# Patient Record
Sex: FEMALE | Race: WHITE | NOT HISPANIC OR LATINO | URBAN - METROPOLITAN AREA
[De-identification: names, ages, dates, MRNs, and addresses within clinical notes are randomized per-mention and may not be internally consistent; named-entity substitution may affect disease eponyms.]

---

## 2017-06-09 ENCOUNTER — OUTPATIENT (OUTPATIENT)
Dept: OUTPATIENT SERVICES | Facility: HOSPITAL | Age: 62
LOS: 1 days | End: 2017-06-09
Payer: COMMERCIAL

## 2017-06-09 ENCOUNTER — APPOINTMENT (OUTPATIENT)
Dept: RADIOLOGY | Facility: CLINIC | Age: 62
End: 2017-06-09

## 2017-06-09 DIAGNOSIS — Z00.8 ENCOUNTER FOR OTHER GENERAL EXAMINATION: ICD-10-CM

## 2017-06-09 DIAGNOSIS — M81.0 AGE-RELATED OSTEOPOROSIS WITHOUT CURRENT PATHOLOGICAL FRACTURE: ICD-10-CM

## 2017-06-09 PROCEDURE — 77080 DXA BONE DENSITY AXIAL: CPT

## 2018-11-27 ENCOUNTER — RX RENEWAL (OUTPATIENT)
Age: 63
End: 2018-11-27

## 2018-12-17 ENCOUNTER — APPOINTMENT (OUTPATIENT)
Dept: ENDOCRINOLOGY | Facility: CLINIC | Age: 63
End: 2018-12-17
Payer: COMMERCIAL

## 2018-12-17 VITALS
BODY MASS INDEX: 21.9 KG/M2 | HEIGHT: 61 IN | OXYGEN SATURATION: 96 % | DIASTOLIC BLOOD PRESSURE: 74 MMHG | WEIGHT: 116 LBS | SYSTOLIC BLOOD PRESSURE: 126 MMHG | HEART RATE: 74 BPM

## 2018-12-17 PROCEDURE — 99203 OFFICE O/P NEW LOW 30 MIN: CPT

## 2018-12-18 ENCOUNTER — TRANSCRIPTION ENCOUNTER (OUTPATIENT)
Age: 63
End: 2018-12-18

## 2019-02-18 ENCOUNTER — APPOINTMENT (OUTPATIENT)
Dept: ENDOCRINOLOGY | Facility: CLINIC | Age: 64
End: 2019-02-18
Payer: COMMERCIAL

## 2019-02-18 VITALS
HEART RATE: 77 BPM | DIASTOLIC BLOOD PRESSURE: 68 MMHG | WEIGHT: 117.38 LBS | OXYGEN SATURATION: 99 % | HEIGHT: 61 IN | SYSTOLIC BLOOD PRESSURE: 104 MMHG | BODY MASS INDEX: 22.16 KG/M2

## 2019-02-18 PROCEDURE — 99213 OFFICE O/P EST LOW 20 MIN: CPT

## 2019-02-18 NOTE — ASSESSMENT
[FreeTextEntry1] : Patient with a long term history of osteoporosis. Blood work for secondary causes were negative. She is due for a BD in June. . For now she will continue a healthy lifestyle exercise and maintain an  adequate  diet in calcium and vitamin D.She was told in the past she had thyroid nodules and her sister had a h/o of thyroid cancer; she will do thyroid sonogram prior to her next visit.  [Importance of Diet and Exercise] : importance of diet and exercise to improve glycemic control, achieve weight loss and improve cardiovascular health

## 2019-02-18 NOTE — HISTORY OF PRESENT ILLNESS
[FreeTextEntry1] : Patient with a history of osteoporosis and on Boniva for 5 years. Recently this was stopped. She was told that her bone density was stable. She does have a history of a wrist fracture a few years ago. She does take vitamin D and calcium daily. She has no history of kidney stones. She has never been on steroids.Overall her diet is good and she does exercise regularly. As far as she knows her mother never had  a hip fracture. Her sister did have thyroid cancer.

## 2019-02-28 ENCOUNTER — TRANSCRIPTION ENCOUNTER (OUTPATIENT)
Age: 64
End: 2019-02-28

## 2019-03-27 ENCOUNTER — RX RENEWAL (OUTPATIENT)
Age: 64
End: 2019-03-27

## 2019-03-27 ENCOUNTER — MEDICATION RENEWAL (OUTPATIENT)
Age: 64
End: 2019-03-27

## 2019-03-27 DIAGNOSIS — G47.00 INSOMNIA, UNSPECIFIED: ICD-10-CM

## 2019-03-28 ENCOUNTER — RX RENEWAL (OUTPATIENT)
Age: 64
End: 2019-03-28

## 2019-05-20 ENCOUNTER — RX RENEWAL (OUTPATIENT)
Age: 64
End: 2019-05-20

## 2019-05-21 ENCOUNTER — APPOINTMENT (OUTPATIENT)
Dept: INTERNAL MEDICINE | Facility: CLINIC | Age: 64
End: 2019-05-21
Payer: COMMERCIAL

## 2019-05-21 PROCEDURE — 36415 COLL VENOUS BLD VENIPUNCTURE: CPT

## 2019-06-04 ENCOUNTER — APPOINTMENT (OUTPATIENT)
Dept: INTERNAL MEDICINE | Facility: CLINIC | Age: 64
End: 2019-06-04
Payer: COMMERCIAL

## 2019-06-04 ENCOUNTER — NON-APPOINTMENT (OUTPATIENT)
Age: 64
End: 2019-06-04

## 2019-06-04 ENCOUNTER — RECORD ABSTRACTING (OUTPATIENT)
Age: 64
End: 2019-06-04

## 2019-06-04 VITALS
BODY MASS INDEX: 21.62 KG/M2 | HEIGHT: 61.25 IN | WEIGHT: 116 LBS | SYSTOLIC BLOOD PRESSURE: 124 MMHG | DIASTOLIC BLOOD PRESSURE: 80 MMHG

## 2019-06-04 DIAGNOSIS — Z82.0 FAMILY HISTORY OF EPILEPSY AND OTHER DISEASES OF THE NERVOUS SYSTEM: ICD-10-CM

## 2019-06-04 DIAGNOSIS — Z87.448 PERSONAL HISTORY OF OTHER DISEASES OF URINARY SYSTEM: ICD-10-CM

## 2019-06-04 DIAGNOSIS — Z92.89 PERSONAL HISTORY OF OTHER MEDICAL TREATMENT: ICD-10-CM

## 2019-06-04 DIAGNOSIS — Z86.69 PERSONAL HISTORY OF OTHER DISEASES OF THE NERVOUS SYSTEM AND SENSE ORGANS: ICD-10-CM

## 2019-06-04 DIAGNOSIS — M79.606 PAIN IN LEG, UNSPECIFIED: ICD-10-CM

## 2019-06-04 DIAGNOSIS — S54.00XA INJURY OF ULNAR NERVE AT FOREARM LEVEL, UNSPECIFIED ARM, INITIAL ENCOUNTER: ICD-10-CM

## 2019-06-04 DIAGNOSIS — S62.101A FRACTURE OF UNSPECIFIED CARPAL BONE, RIGHT WRIST, INITIAL ENCOUNTER FOR CLOSED FRACTURE: ICD-10-CM

## 2019-06-04 DIAGNOSIS — H02.403 UNSPECIFIED PTOSIS OF BILATERAL EYELIDS: ICD-10-CM

## 2019-06-04 DIAGNOSIS — H33.329 ROUND HOLE, UNSPECIFIED EYE: ICD-10-CM

## 2019-06-04 DIAGNOSIS — Z01.818 ENCOUNTER FOR OTHER PREPROCEDURAL EXAMINATION: ICD-10-CM

## 2019-06-04 DIAGNOSIS — R31.29 OTHER MICROSCOPIC HEMATURIA: ICD-10-CM

## 2019-06-04 DIAGNOSIS — Z87.440 PERSONAL HISTORY OF URINARY (TRACT) INFECTIONS: ICD-10-CM

## 2019-06-04 DIAGNOSIS — F41.9 ANXIETY DISORDER, UNSPECIFIED: ICD-10-CM

## 2019-06-04 DIAGNOSIS — Z87.39 PERSONAL HISTORY OF OTHER DISEASES OF THE MUSCULOSKELETAL SYSTEM AND CONNECTIVE TISSUE: ICD-10-CM

## 2019-06-04 LAB
25(OH)D3 SERPL-MCNC: 43.2 NG/ML
ALBUMIN SERPL ELPH-MCNC: 4.9 G/DL
ALP BLD-CCNC: 66 U/L
ALT SERPL-CCNC: 24 U/L
ANION GAP SERPL CALC-SCNC: 10 MMOL/L
AST SERPL-CCNC: 19 U/L
BASOPHILS # BLD AUTO: 0.04 K/UL
BASOPHILS NFR BLD AUTO: 1.2 %
BILIRUB SERPL-MCNC: 0.8 MG/DL
BILIRUB UR QL STRIP: NEGATIVE
BUN SERPL-MCNC: 20 MG/DL
CALCIUM SERPL-MCNC: 9.9 MG/DL
CHLORIDE SERPL-SCNC: 105 MMOL/L
CHOLEST SERPL-MCNC: 193 MG/DL
CHOLEST/HDLC SERPL: 3.3 RATIO
CK SERPL-CCNC: 52 U/L
CLARITY UR: CLEAR
CO2 SERPL-SCNC: 28 MMOL/L
CREAT SERPL-MCNC: 0.78 MG/DL
EOSINOPHIL # BLD AUTO: 0.05 K/UL
EOSINOPHIL NFR BLD AUTO: 1.5 %
ESTIMATED AVERAGE GLUCOSE: 108 MG/DL
FERRITIN SERPL-MCNC: 67 NG/ML
GLUCOSE SERPL-MCNC: 110 MG/DL
GLUCOSE UR-MCNC: NEGATIVE
HBA1C MFR BLD HPLC: 5.4 %
HCG UR QL: NORMAL EU/DL
HCT VFR BLD CALC: 41.2 %
HDLC SERPL-MCNC: 58 MG/DL
HGB BLD-MCNC: 12.5 G/DL
HGB UR QL STRIP.AUTO: NORMAL
IMM GRANULOCYTES NFR BLD AUTO: 0.3 %
IRON SATN MFR SERPL: 44 %
IRON SERPL-MCNC: 184 UG/DL
KETONES UR-MCNC: NEGATIVE
LDLC SERPL CALC-MCNC: 114 MG/DL
LEUKOCYTE ESTERASE UR QL STRIP: NORMAL
LYMPHOCYTES # BLD AUTO: 1.21 K/UL
LYMPHOCYTES NFR BLD AUTO: 36 %
MAN DIFF?: NORMAL
MCHC RBC-ENTMCNC: 22.4 PG
MCHC RBC-ENTMCNC: 30.3 GM/DL
MCV RBC AUTO: 74 FL
MONOCYTES # BLD AUTO: 0.28 K/UL
MONOCYTES NFR BLD AUTO: 8.3 %
NEUTROPHILS # BLD AUTO: 1.77 K/UL
NEUTROPHILS NFR BLD AUTO: 52.7 %
NITRITE UR QL STRIP: NEGATIVE
PH UR STRIP: 6
PLATELET # BLD AUTO: 319 K/UL
POTASSIUM SERPL-SCNC: 4.3 MMOL/L
PROT SERPL-MCNC: 7.3 G/DL
PROT UR STRIP-MCNC: NEGATIVE
RBC # BLD: 5.57 M/UL
RBC # FLD: 15 %
SODIUM SERPL-SCNC: 143 MMOL/L
SP GR UR STRIP: 1.02
T4 FREE SERPL-MCNC: 1.2 NG/DL
TIBC SERPL-MCNC: 416 UG/DL
TRIGL SERPL-MCNC: 107 MG/DL
TSH SERPL-ACNC: 3.08 UIU/ML
UIBC SERPL-MCNC: 232 UG/DL
VIT B12 SERPL-MCNC: 1002 PG/ML
WBC # FLD AUTO: 3.36 K/UL

## 2019-06-04 PROCEDURE — 81003 URINALYSIS AUTO W/O SCOPE: CPT | Mod: QW

## 2019-06-04 PROCEDURE — 36415 COLL VENOUS BLD VENIPUNCTURE: CPT

## 2019-06-04 PROCEDURE — 99396 PREV VISIT EST AGE 40-64: CPT | Mod: 25

## 2019-06-04 PROCEDURE — 93000 ELECTROCARDIOGRAM COMPLETE: CPT

## 2019-06-04 NOTE — ASSESSMENT
[FreeTextEntry1] : doing well at ideal weight\par exercising without issues\par osteoporosis sees Hyacinth Barragan\par for bone densitometry and thyroid sono\par has appointment for colonoscopy 8 2019 Jannet

## 2019-06-04 NOTE — HISTORY OF PRESENT ILLNESS
[FreeTextEntry1] : here for complete exam [de-identified] : Osteoporosis now seeing Dr. Barragan in consultation off meds\par Normal colonoscopy 2013\par Regular GYN Pap and mammogram\par Family history sister with thyroid cancer\par long hx microscopic hematuria saw Thall in past for this several times negative workups \par doing well on low dose escitalopram not interested in stopping it, very infrequent use of xanax and zolpidem since she is on it

## 2019-06-09 ENCOUNTER — FORM ENCOUNTER (OUTPATIENT)
Age: 64
End: 2019-06-09

## 2019-06-10 ENCOUNTER — APPOINTMENT (OUTPATIENT)
Dept: ULTRASOUND IMAGING | Facility: CLINIC | Age: 64
End: 2019-06-10
Payer: COMMERCIAL

## 2019-06-10 ENCOUNTER — APPOINTMENT (OUTPATIENT)
Dept: RADIOLOGY | Facility: CLINIC | Age: 64
End: 2019-06-10
Payer: COMMERCIAL

## 2019-06-10 ENCOUNTER — OUTPATIENT (OUTPATIENT)
Dept: OUTPATIENT SERVICES | Facility: HOSPITAL | Age: 64
LOS: 1 days | End: 2019-06-10
Payer: COMMERCIAL

## 2019-06-10 DIAGNOSIS — E04.9 NONTOXIC GOITER, UNSPECIFIED: ICD-10-CM

## 2019-06-10 DIAGNOSIS — Z00.8 ENCOUNTER FOR OTHER GENERAL EXAMINATION: ICD-10-CM

## 2019-06-10 LAB
MEV IGG FLD QL IA: >300 AU/ML
MEV IGG+IGM SER-IMP: POSITIVE

## 2019-06-10 PROCEDURE — 77080 DXA BONE DENSITY AXIAL: CPT

## 2019-06-10 PROCEDURE — 77080 DXA BONE DENSITY AXIAL: CPT | Mod: 26

## 2019-06-10 PROCEDURE — 76536 US EXAM OF HEAD AND NECK: CPT | Mod: 26

## 2019-06-10 PROCEDURE — 76536 US EXAM OF HEAD AND NECK: CPT

## 2019-07-10 ENCOUNTER — APPOINTMENT (OUTPATIENT)
Dept: ENDOCRINOLOGY | Facility: CLINIC | Age: 64
End: 2019-07-10
Payer: COMMERCIAL

## 2019-07-10 VITALS
HEART RATE: 76 BPM | DIASTOLIC BLOOD PRESSURE: 62 MMHG | BODY MASS INDEX: 21.9 KG/M2 | SYSTOLIC BLOOD PRESSURE: 118 MMHG | HEIGHT: 61.25 IN | OXYGEN SATURATION: 96 % | WEIGHT: 117.5 LBS

## 2019-07-10 PROCEDURE — 99213 OFFICE O/P EST LOW 20 MIN: CPT

## 2019-07-10 NOTE — HISTORY OF PRESENT ILLNESS
[FreeTextEntry1] : Patient with a history of osteoporosis and on Boniva for 5 years. Recently this was stopped. She was told that her bone density was stable. She does have a history of a wrist fracture a few years ago. She does take vitamin D and calcium daily. She has no history of kidney stones. She has never been on steroids.Overall her diet is good and she does exercise regularly. As far as she knows her mother never had  a hip fracture. Her sister did have thyroid cancer. \par She recently repeated the BD and did a thyroid sonogram.

## 2019-07-10 NOTE — PHYSICAL EXAM
[No Acute Distress] : no acute distress [Alert] : alert [Well Nourished] : well nourished [Well Developed] : well developed [EOMI] : extra ocular movement intact [Normal Sclera/Conjunctiva] : normal sclera/conjunctiva [No Proptosis] : no proptosis [Normal Oropharynx] : the oropharynx was normal [Thyroid Not Enlarged] : the thyroid was not enlarged [No Thyroid Nodules] : there were no palpable thyroid nodules [No Accessory Muscle Use] : no accessory muscle use [No Respiratory Distress] : no respiratory distress [Normal S1, S2] : normal S1 and S2 [Normal Rate] : heart rate was normal  [Clear to Auscultation] : lungs were clear to auscultation bilaterally [Regular Rhythm] : with a regular rhythm [Pedal Pulses Normal] : the pedal pulses are present [Normal Bowel Sounds] : normal bowel sounds [No Edema] : there was no peripheral edema [Soft] : abdomen soft [Not Tender] : non-tender [Not Distended] : not distended [Post Cervical Nodes] : posterior cervical nodes [Anterior Cervical Nodes] : anterior cervical nodes [Axillary Nodes] : axillary nodes [Normal] : normal and non tender [No Spinal Tenderness] : no spinal tenderness [Spine Straight] : spine straight [No Stigmata of Cushings Syndrome] : no stigmata of cushings syndrome [Normal Gait] : normal gait [Normal Strength/Tone] : muscle strength and tone were normal [Acanthosis Nigricans] : no acanthosis nigricans [No Rash] : no rash [Normal Reflexes] : deep tendon reflexes were 2+ and symmetric [Oriented x3] : oriented to person, place, and time [No Tremors] : no tremors

## 2019-10-11 ENCOUNTER — MOBILE ON CALL (OUTPATIENT)
Age: 64
End: 2019-10-11

## 2019-11-11 ENCOUNTER — APPOINTMENT (OUTPATIENT)
Dept: ENDOCRINOLOGY | Facility: CLINIC | Age: 64
End: 2019-11-11

## 2019-12-02 ENCOUNTER — MEDICATION RENEWAL (OUTPATIENT)
Age: 64
End: 2019-12-02

## 2019-12-03 ENCOUNTER — MEDICATION RENEWAL (OUTPATIENT)
Age: 64
End: 2019-12-03

## 2020-07-01 ENCOUNTER — NON-APPOINTMENT (OUTPATIENT)
Age: 65
End: 2020-07-01

## 2020-07-01 ENCOUNTER — APPOINTMENT (OUTPATIENT)
Dept: INTERNAL MEDICINE | Facility: CLINIC | Age: 65
End: 2020-07-01
Payer: COMMERCIAL

## 2020-07-01 VITALS — HEIGHT: 61 IN | BODY MASS INDEX: 20.77 KG/M2 | WEIGHT: 110 LBS

## 2020-07-01 VITALS — DIASTOLIC BLOOD PRESSURE: 70 MMHG | SYSTOLIC BLOOD PRESSURE: 108 MMHG

## 2020-07-01 DIAGNOSIS — R03.0 ELEVATED BLOOD-PRESSURE READING, W/OUT DIAGNOSIS OF HYPERTENSION: ICD-10-CM

## 2020-07-01 DIAGNOSIS — I10 ESSENTIAL (PRIMARY) HYPERTENSION: ICD-10-CM

## 2020-07-01 LAB
BILIRUB UR QL STRIP: NEGATIVE
GLUCOSE UR-MCNC: NEGATIVE
HCG UR QL: 0.2 EU/DL
HGB UR QL STRIP.AUTO: NORMAL
KETONES UR-MCNC: NEGATIVE
LEUKOCYTE ESTERASE UR QL STRIP: NORMAL
NITRITE UR QL STRIP: NEGATIVE
PH UR STRIP: 6.5
PROT UR STRIP-MCNC: NORMAL
SP GR UR STRIP: 1.02

## 2020-07-01 PROCEDURE — 93000 ELECTROCARDIOGRAM COMPLETE: CPT | Mod: 59

## 2020-07-01 PROCEDURE — 99396 PREV VISIT EST AGE 40-64: CPT | Mod: 25

## 2020-07-01 PROCEDURE — G0444 DEPRESSION SCREEN ANNUAL: CPT

## 2020-07-01 PROCEDURE — 36415 COLL VENOUS BLD VENIPUNCTURE: CPT

## 2020-07-01 PROCEDURE — 81003 URINALYSIS AUTO W/O SCOPE: CPT | Mod: QW

## 2020-07-01 RX ORDER — TERIPARATIDE 250 UG/ML
600 INJECTION, SOLUTION SUBCUTANEOUS DAILY
Qty: 1 | Refills: 5 | Status: COMPLETED | COMMUNITY
Start: 2019-07-10 | End: 2020-07-01

## 2020-07-01 NOTE — HISTORY OF PRESENT ILLNESS
[FreeTextEntry1] : here for complete exam [de-identified] : osteoporosis sees Dr Hyacinth Barragan in consultation on Forteo\par normal colonoscopy 2013\par Regular GYN Pap and mammogram\par Family history sister with thyroid cancer\par long hx microscopic hematuria saw Thall in past for this several times negative workups \par doing well on low dose escitalopram

## 2020-07-01 NOTE — PHYSICAL EXAM
[No Acute Distress] : no acute distress [Well Developed] : well developed [Well Nourished] : well nourished [Normal Sclera/Conjunctiva] : normal sclera/conjunctiva [Well-Appearing] : well-appearing [PERRL] : pupils equal round and reactive to light [Normal Outer Ear/Nose] : the outer ears and nose were normal in appearance [No JVD] : no jugular venous distention [No Lymphadenopathy] : no lymphadenopathy [Supple] : supple [Thyroid Normal, No Nodules] : the thyroid was normal and there were no nodules present [No Respiratory Distress] : no respiratory distress  [No Accessory Muscle Use] : no accessory muscle use [Normal Rate] : normal rate  [Regular Rhythm] : with a regular rhythm [Clear to Auscultation] : lungs were clear to auscultation bilaterally [Normal S1, S2] : normal S1 and S2 [I] : a grade 1 [No Carotid Bruits] : no carotid bruits [No Abdominal Bruit] : a ~M bruit was not heard ~T in the abdomen [No Varicosities] : no varicosities [Pedal Pulses Present] : the pedal pulses are present [No Palpable Aorta] : no palpable aorta [No Edema] : there was no peripheral edema [No Extremity Clubbing/Cyanosis] : no extremity clubbing/cyanosis [Soft] : abdomen soft [Non Tender] : non-tender [Non-distended] : non-distended [No Masses] : no abdominal mass palpated [Normal Bowel Sounds] : normal bowel sounds [No HSM] : no HSM [Normal Posterior Cervical Nodes] : no posterior cervical lymphadenopathy [Normal Anterior Cervical Nodes] : no anterior cervical lymphadenopathy [No CVA Tenderness] : no CVA  tenderness [No Spinal Tenderness] : no spinal tenderness [No Joint Swelling] : no joint swelling [No Rash] : no rash [Grossly Normal Strength/Tone] : grossly normal strength/tone [No Focal Deficits] : no focal deficits [Coordination Grossly Intact] : coordination grossly intact [Deep Tendon Reflexes (DTR)] : deep tendon reflexes were 2+ and symmetric [Normal Affect] : the affect was normal [Normal Gait] : normal gait [Normal Insight/Judgement] : insight and judgment were intact

## 2020-07-01 NOTE — REVIEW OF SYSTEMS
[Negative] : Psychiatric [FreeTextEntry9] : some mild low back pain when wakes up, knee and ankle issues

## 2020-07-01 NOTE — ASSESSMENT
[FreeTextEntry1] : doing well at ideal weight\par exercising without issues\par osteoporosis not on meds\par for thyroid sono due to nodule and FH thyroid cancer

## 2020-07-02 LAB
25(OH)D3 SERPL-MCNC: 42.1 NG/ML
ALBUMIN SERPL ELPH-MCNC: 5.2 G/DL
ALP BLD-CCNC: 90 U/L
ALT SERPL-CCNC: 16 U/L
ANION GAP SERPL CALC-SCNC: 15 MMOL/L
APPEARANCE: CLEAR
AST SERPL-CCNC: 17 U/L
BACTERIA: ABNORMAL
BASOPHILS # BLD AUTO: 0.04 K/UL
BASOPHILS NFR BLD AUTO: 0.9 %
BILIRUB SERPL-MCNC: 0.7 MG/DL
BILIRUBIN URINE: NEGATIVE
BLOOD URINE: ABNORMAL
BUN SERPL-MCNC: 18 MG/DL
CALCIUM SERPL-MCNC: 9.9 MG/DL
CHLORIDE SERPL-SCNC: 101 MMOL/L
CHOLEST SERPL-MCNC: 186 MG/DL
CHOLEST/HDLC SERPL: 2.6 RATIO
CK SERPL-CCNC: 63 U/L
CO2 SERPL-SCNC: 27 MMOL/L
COLOR: YELLOW
CREAT SERPL-MCNC: 0.79 MG/DL
EOSINOPHIL # BLD AUTO: 0.04 K/UL
EOSINOPHIL NFR BLD AUTO: 0.9 %
ESTIMATED AVERAGE GLUCOSE: 105 MG/DL
GLUCOSE QUALITATIVE U: NEGATIVE
GLUCOSE SERPL-MCNC: 88 MG/DL
HBA1C MFR BLD HPLC: 5.3 %
HCT VFR BLD CALC: 44.1 %
HDLC SERPL-MCNC: 72 MG/DL
HGB BLD-MCNC: 12.9 G/DL
HYALINE CASTS: 0 /LPF
IMM GRANULOCYTES NFR BLD AUTO: 0.2 %
KETONES URINE: NEGATIVE
LDLC SERPL CALC-MCNC: 100 MG/DL
LEUKOCYTE ESTERASE URINE: ABNORMAL
LYMPHOCYTES # BLD AUTO: 1.24 K/UL
LYMPHOCYTES NFR BLD AUTO: 27.7 %
MAN DIFF?: NORMAL
MCHC RBC-ENTMCNC: 22.6 PG
MCHC RBC-ENTMCNC: 29.3 GM/DL
MCV RBC AUTO: 77.2 FL
MICROSCOPIC-UA: NORMAL
MONOCYTES # BLD AUTO: 0.37 K/UL
MONOCYTES NFR BLD AUTO: 8.3 %
NEUTROPHILS # BLD AUTO: 2.77 K/UL
NEUTROPHILS NFR BLD AUTO: 62 %
NITRITE URINE: NEGATIVE
PH URINE: 6.5
PLATELET # BLD AUTO: 307 K/UL
POTASSIUM SERPL-SCNC: 4.3 MMOL/L
PROT SERPL-MCNC: 7.6 G/DL
PROTEIN URINE: NORMAL
RBC # BLD: 5.71 M/UL
RBC # FLD: 16.4 %
RED BLOOD CELLS URINE: 10 /HPF
SARS-COV-2 IGG SERPL IA-ACNC: 0.08 INDEX
SARS-COV-2 IGG SERPL QL IA: NEGATIVE
SODIUM SERPL-SCNC: 143 MMOL/L
SPECIFIC GRAVITY URINE: 1.03
SQUAMOUS EPITHELIAL CELLS: 9 /HPF
T4 FREE SERPL-MCNC: 1.3 NG/DL
TRIGL SERPL-MCNC: 70 MG/DL
TSH SERPL-ACNC: 1.75 UIU/ML
UROBILINOGEN URINE: NORMAL
VIT B12 SERPL-MCNC: 1014 PG/ML
WBC # FLD AUTO: 4.47 K/UL
WHITE BLOOD CELLS URINE: 13 /HPF

## 2020-07-18 ENCOUNTER — APPOINTMENT (OUTPATIENT)
Dept: ULTRASOUND IMAGING | Facility: CLINIC | Age: 65
End: 2020-07-18
Payer: COMMERCIAL

## 2020-07-18 ENCOUNTER — OUTPATIENT (OUTPATIENT)
Dept: OUTPATIENT SERVICES | Facility: HOSPITAL | Age: 65
LOS: 1 days | End: 2020-07-18
Payer: COMMERCIAL

## 2020-07-18 DIAGNOSIS — E04.1 NONTOXIC SINGLE THYROID NODULE: ICD-10-CM

## 2020-07-18 PROCEDURE — 76536 US EXAM OF HEAD AND NECK: CPT | Mod: 26

## 2020-07-18 PROCEDURE — 76536 US EXAM OF HEAD AND NECK: CPT

## 2020-07-20 RX ORDER — FLUOCINONIDE 0.5 MG/G
0.05 CREAM TOPICAL TWICE DAILY
Qty: 1 | Refills: 2 | Status: ACTIVE | COMMUNITY
Start: 2020-07-20 | End: 1900-01-01

## 2020-08-13 ENCOUNTER — APPOINTMENT (OUTPATIENT)
Dept: ENDOCRINOLOGY | Facility: CLINIC | Age: 65
End: 2020-08-13
Payer: COMMERCIAL

## 2020-08-13 VITALS
WEIGHT: 110 LBS | DIASTOLIC BLOOD PRESSURE: 60 MMHG | OXYGEN SATURATION: 99 % | RESPIRATION RATE: 16 BRPM | SYSTOLIC BLOOD PRESSURE: 108 MMHG | HEART RATE: 72 BPM | HEIGHT: 61 IN | BODY MASS INDEX: 20.77 KG/M2

## 2020-08-13 PROCEDURE — 99204 OFFICE O/P NEW MOD 45 MIN: CPT

## 2020-08-13 NOTE — HISTORY OF PRESENT ILLNESS
[FreeTextEntry1] : 65 year female  referred for osteoporosis management. \par She  was diagnosed with osteoporosis in 2012. On Boniva for 5 years, stopped for "drug holiday" in 2017 and was told that her BMD was stable. However\par Cancer history: none\par Calcium and vitamin D supplementation: calcium 600 mg with D, no extra vitamin D\par Fracture history: wrist fracture a few years ago\par Bone disease risk factors: no history of kidney stones, liver disease, kidney disease, thyroid disease, anticonvulsant use, glucocorticoid use, autoimmune disorders such as rheumatoid arthritis and SLE, COPD, IBD, known malabsorption disorders, or weight loss. \par Family history is negative for calcium disorders, nephrolithiasis, pancreatitis and tumors. \par *** Patient's sister was diagnosed with a thyroid cancer\par Currently, no symptoms of polyuria, polydipsia, constipation, abdominal pain, mental confusion, depression, bone pain or fractures. \par She  denies hot flashes, leg swelling, blood clots, history of radiation therapy.\par She  lost about 1/2" in height.\par DXA (6/10/19)- FN (-2.7), LS (-2.2) with OA at L4 and L1 (-2.8) and L2 (-3.2)\par DXA (6/9/17)- FN (-2.6), LS (-2.4) with OA at L4 and L1 (-3.1) and L2 (-3.3)\par DXA (11/18/15) at [facility] - FN (-2.8), LS (-2.2) with OA at L4 and L1 (-2.9) and L2 (-3.2)\par DXA (10/12/12) at [facility] - FN (-2.9), LS (-2.6) with OA at L4 and L1 (-3.0) and L2 (-2.9)\par \par Thyroid US (7/18/20)- heterogenous thyroid, right sided 0.5x0.4x0.3 hypo, stable Left sided 0.9x0.4x0.6 nodule\par Thyroid US (6/10/19)- LLP 0.8x0.3x0.6 iso nodule

## 2020-08-13 NOTE — CONSULT LETTER
[Dear  ___] : Dear  [unfilled], [Sincerely,] : Sincerely, [FreeTextEntry1] : Thank you for referring  Ms. SIDRA WEI to me for evaluation and treatment. Please, see attached consultation note. As always, if there are specific questions you would like to discuss, please feel free to contact me.\par Thank you for the courtesy of this evaluation.\par  [FreeTextEntry3] : Lorin Rolon MD, FACE, [facility]\par

## 2020-08-13 NOTE — ASSESSMENT
[FreeTextEntry1] : Osteoporosis at the spine/ hip areas. Overall stable BMD, with some decline at the hip area\par I advised to the patient on antiresorptive therapy, and reviewed potential options for osteoporosis treatment, including oral and IV bisphosphonates, prolia, and rh-PTH therapy. R+B of each options were discussed in details\par - increase calcium 500 mg bid, add extra OTC vitamin D3 2,000 IU/day\par - continue weight-bearing exercises; advised avoiding high risk sports\par - prior dental evaluation advised.\par - given slight worsening of BMD in the hip, and overall low BMD at the LS areas, resuming an ART is indicated. I agree that she would be a good candidate for PTH analogue therapy, followed by Prolia injections.\par - will screen for contraindications to the therapy first\par - next DXA 3 sites in 06/21\par In regards of her MNG, given (+) family history of thyroid cancer, close observation is advised. We'll repeat another thyroid US in 01/21 at [facility], and might consider an early FNA\par RTC next available for injection teaching

## 2020-09-17 LAB
25(OH)D3 SERPL-MCNC: 43.4 NG/ML
ALBUMIN MFR SERPL ELPH: 62.9 %
ALBUMIN SERPL ELPH-MCNC: 4.6 G/DL
ALBUMIN SERPL-MCNC: 4.3 G/DL
ALBUMIN/GLOB SERPL: 1.7 RATIO
ALP BLD-CCNC: 66 U/L
ALP BLD-CCNC: 68 U/L
ALP BONE SERPL-MCNC: 12 MCG/L
ALPHA1 GLOB MFR SERPL ELPH: 3.2 %
ALPHA1 GLOB SERPL ELPH-MCNC: 0.2 G/DL
ALPHA2 GLOB MFR SERPL ELPH: 9.6 %
ALPHA2 GLOB SERPL ELPH-MCNC: 0.7 G/DL
ALT SERPL-CCNC: 15 U/L
ANION GAP SERPL CALC-SCNC: 12 MMOL/L
AST SERPL-CCNC: 16 U/L
B-GLOBULIN MFR SERPL ELPH: 11.5 %
B-GLOBULIN SERPL ELPH-MCNC: 0.8 G/DL
BASOPHILS # BLD AUTO: 0.04 K/UL
BASOPHILS NFR BLD AUTO: 0.8 %
BILIRUB SERPL-MCNC: 0.4 MG/DL
BUN SERPL-MCNC: 19 MG/DL
CALCIUM SERPL-MCNC: 9.3 MG/DL
CALCIUM SERPL-MCNC: 9.3 MG/DL
CAU: 18 MG/DL
CHLORIDE SERPL-SCNC: 105 MMOL/L
CO2 SERPL-SCNC: 25 MMOL/L
COLLAGEN CTX SERPL-MCNC: 501 PG/ML
CREAT 24H UR-MCNC: 1 G/24 H
CREAT 24H UR-MCNC: 1 G/24 H
CREAT ?TM UR-MCNC: 62 MG/DL
CREAT ?TM UR-MCNC: 62 MG/DL
CREAT SERPL-MCNC: 0.74 MG/DL
DEPRECATED KAPPA LC FREE/LAMBDA SER: 1.37 RATIO
EOSINOPHIL # BLD AUTO: 0.07 K/UL
EOSINOPHIL NFR BLD AUTO: 1.5 %
GAMMA GLOB FLD ELPH-MCNC: 0.9 G/DL
GAMMA GLOB MFR SERPL ELPH: 12.8 %
GLUCOSE SERPL-MCNC: 106 MG/DL
HCT VFR BLD CALC: 39.5 %
HGB BLD-MCNC: 11.9 G/DL
IMM GRANULOCYTES NFR BLD AUTO: 0.2 %
INTERPRETATION SERPL IEP-IMP: NORMAL
KAPPA LC CSF-MCNC: 0.83 MG/DL
KAPPA LC SERPL-MCNC: 1.14 MG/DL
LYMPHOCYTES # BLD AUTO: 1.47 K/UL
LYMPHOCYTES NFR BLD AUTO: 30.6 %
MAGNESIUM SERPL-MCNC: 2 MG/DL
MAN DIFF?: NORMAL
MCHC RBC-ENTMCNC: 21.9 PG
MCHC RBC-ENTMCNC: 30.1 GM/DL
MCV RBC AUTO: 72.7 FL
MONOCYTES # BLD AUTO: 0.35 K/UL
MONOCYTES NFR BLD AUTO: 7.3 %
NEUTROPHILS # BLD AUTO: 2.86 K/UL
NEUTROPHILS NFR BLD AUTO: 59.6 %
OSTEOCALCIN SERPL-MCNC: 22 NG/ML
PARATHYROID HORMONE INTACT: 53 PG/ML
PHOSPHATE SERPL-MCNC: 3.2 MG/DL
PLATELET # BLD AUTO: 263 K/UL
POTASSIUM SERPL-SCNC: 3.7 MMOL/L
PROT ?TM UR-MCNC: 24 HR
PROT ?TM UR-MCNC: 24 HR
PROT SERPL-MCNC: 6.8 G/DL
RBC # BLD: 5.43 M/UL
RBC # FLD: 15.1 %
SODIUM SERPL-SCNC: 142 MMOL/L
SPECIMEN VOL 24H UR: 1575 ML
SPECIMEN VOL 24H UR: 1575 ML
SPECIMEN VOL 24H UR: 284 MG/24 H
T4 FREE SERPL-MCNC: 1.1 NG/DL
THYROGLOB AB SERPL-ACNC: <20 IU/ML
THYROPEROXIDASE AB SERPL IA-ACNC: 248 IU/ML
TSH SERPL-ACNC: 1.6 UIU/ML
TTG IGA SER IA-ACNC: <1.2 U/ML
TTG IGA SER-ACNC: NEGATIVE
TTG IGG SER IA-ACNC: 3.1 U/ML
TTG IGG SER IA-ACNC: NEGATIVE
WBC # FLD AUTO: 4.8 K/UL

## 2020-10-02 ENCOUNTER — TRANSCRIPTION ENCOUNTER (OUTPATIENT)
Age: 65
End: 2020-10-02

## 2021-01-26 LAB
25(OH)D3 SERPL-MCNC: 39.3 NG/ML
ANION GAP SERPL CALC-SCNC: 13 MMOL/L
BUN SERPL-MCNC: 24 MG/DL
CALCIUM SERPL-MCNC: 9.3 MG/DL
CHLORIDE SERPL-SCNC: 104 MMOL/L
CO2 SERPL-SCNC: 26 MMOL/L
CREAT SERPL-MCNC: 0.84 MG/DL
GLUCOSE SERPL-MCNC: 96 MG/DL
POTASSIUM SERPL-SCNC: 4 MMOL/L
SODIUM SERPL-SCNC: 143 MMOL/L

## 2021-03-02 ENCOUNTER — NON-APPOINTMENT (OUTPATIENT)
Age: 66
End: 2021-03-02

## 2021-04-05 ENCOUNTER — APPOINTMENT (OUTPATIENT)
Dept: ENDOCRINOLOGY | Facility: CLINIC | Age: 66
End: 2021-04-05
Payer: COMMERCIAL

## 2021-04-05 VITALS
OXYGEN SATURATION: 98 % | WEIGHT: 112 LBS | DIASTOLIC BLOOD PRESSURE: 60 MMHG | RESPIRATION RATE: 16 BRPM | HEIGHT: 61 IN | SYSTOLIC BLOOD PRESSURE: 112 MMHG | HEART RATE: 78 BPM | BODY MASS INDEX: 21.14 KG/M2 | TEMPERATURE: 98.4 F

## 2021-04-05 PROCEDURE — 99214 OFFICE O/P EST MOD 30 MIN: CPT

## 2021-04-05 PROCEDURE — 99072 ADDL SUPL MATRL&STAF TM PHE: CPT

## 2021-04-05 RX ORDER — TERIPARATIDE 250 UG/ML
600 INJECTION, SOLUTION SUBCUTANEOUS
Qty: 3 | Refills: 3 | Status: DISCONTINUED | COMMUNITY
Start: 2020-10-07 | End: 2021-04-05

## 2021-04-05 NOTE — HISTORY OF PRESENT ILLNESS
[FreeTextEntry1] : 65 year female  referred for osteoporosis management. \par \par \par *** Apr 05, 2021 ***\par \par stopped Forteo b/o severe arthralgia and poor sleep. Symptoms improved since stopping \par came to discuss other options\par \par HPI:\par She  was diagnosed with osteoporosis in 2012. On Boniva for 5 years, stopped for "drug holiday" in 2017 and was told that her BMD was stable. \par Cancer history: none\par Calcium and vitamin D supplementation: calcium 600 mg with D, no extra vitamin D\par Fracture history: wrist fracture a few years ago\par Bone disease risk factors: no history of kidney stones, liver disease, kidney disease, thyroid disease, anticonvulsant use, glucocorticoid use, autoimmune disorders such as rheumatoid arthritis and SLE, COPD, IBD, known malabsorption disorders, or weight loss. \par Family history is negative for calcium disorders, nephrolithiasis, pancreatitis and tumors. \par *** Patient's sister was diagnosed with a thyroid cancer\par Currently, no symptoms of polyuria, polydipsia, constipation, abdominal pain, mental confusion, depression, bone pain or fractures. \par She  denies hot flashes, leg swelling, blood clots, history of radiation therapy.\par She  lost about 1/2" in height.\par DXA (6/10/19)- FN (-2.7), LS (-2.2) with OA at L4 and L1 (-2.8) and L2 (-3.2)\par DXA (6/9/17)- FN (-2.6), LS (-2.4) with OA at L4 and L1 (-3.1) and L2 (-3.3)\par DXA (11/18/15) at [facility] - FN (-2.8), LS (-2.2) with OA at L4 and L1 (-2.9) and L2 (-3.2)\par DXA (10/12/12) at [facility] - FN (-2.9), LS (-2.6) with OA at L4 and L1 (-3.0) and L2 (-2.9)\par \par Thyroid US (7/18/20)- heterogenous thyroid, right sided 0.5x0.4x0.3 hypo, stable Left sided 0.9x0.4x0.6 nodule\par Thyroid US (6/10/19)- LLP 0.8x0.3x0.6 iso nodule

## 2021-04-05 NOTE — ASSESSMENT
[FreeTextEntry1] : Osteoporosis at the spine/ hip areas. Overall stable BMD, with some decline at the hip area\par I advised to the patient on other antiresorptive therapy, including retrying Tymlos, switching to Evenity or prolia. \par R+B of each options were discussed in details\par - calcium 500 mg bid, add extra OTC vitamin D3 2,000 IU/day\par - continue weight-bearing exercises; advised avoiding high risk sports\par - prior dental evaluation advised.\par - given slight worsening of BMD in the hip, and overall low BMD at the LS areas, resuming an ART is indicated. We mutually agreed to try Tymlos first, and if does not tolerate, will switch to Evenity, followed by Prolia injections.\par - next DXA 3 sites in 06/21\par In regards of her MNG, given (+) family history of thyroid cancer, close observation is advised. We'll repeat another thyroid US  at [facility], and might consider an early FNA\par RTC 3 mos, or sooner prn

## 2021-04-06 ENCOUNTER — TRANSCRIPTION ENCOUNTER (OUTPATIENT)
Age: 66
End: 2021-04-06

## 2021-04-19 ENCOUNTER — TRANSCRIPTION ENCOUNTER (OUTPATIENT)
Age: 66
End: 2021-04-19

## 2021-04-26 ENCOUNTER — TRANSCRIPTION ENCOUNTER (OUTPATIENT)
Age: 66
End: 2021-04-26

## 2021-05-07 ENCOUNTER — TRANSCRIPTION ENCOUNTER (OUTPATIENT)
Age: 66
End: 2021-05-07

## 2021-06-29 ENCOUNTER — OUTPATIENT (OUTPATIENT)
Dept: OUTPATIENT SERVICES | Facility: HOSPITAL | Age: 66
LOS: 1 days | End: 2021-06-29
Payer: COMMERCIAL

## 2021-06-29 ENCOUNTER — APPOINTMENT (OUTPATIENT)
Dept: RADIOLOGY | Facility: CLINIC | Age: 66
End: 2021-06-29
Payer: COMMERCIAL

## 2021-06-29 DIAGNOSIS — Z00.8 ENCOUNTER FOR OTHER GENERAL EXAMINATION: ICD-10-CM

## 2021-06-29 PROCEDURE — 77085 DXA BONE DENSITY AXL VRT FX: CPT

## 2021-06-29 PROCEDURE — 77085 DXA BONE DENSITY AXL VRT FX: CPT | Mod: 26

## 2021-07-01 ENCOUNTER — NON-APPOINTMENT (OUTPATIENT)
Age: 66
End: 2021-07-01

## 2021-07-01 ENCOUNTER — RESULT CHARGE (OUTPATIENT)
Age: 66
End: 2021-07-01

## 2021-07-01 ENCOUNTER — APPOINTMENT (OUTPATIENT)
Dept: INTERNAL MEDICINE | Facility: CLINIC | Age: 66
End: 2021-07-01
Payer: COMMERCIAL

## 2021-07-01 VITALS — HEIGHT: 61.2 IN | BODY MASS INDEX: 20.58 KG/M2 | WEIGHT: 109 LBS

## 2021-07-01 VITALS — SYSTOLIC BLOOD PRESSURE: 110 MMHG | DIASTOLIC BLOOD PRESSURE: 60 MMHG

## 2021-07-01 DIAGNOSIS — Z00.00 ENCOUNTER FOR GENERAL ADULT MEDICAL EXAMINATION W/OUT ABNORMAL FINDINGS: ICD-10-CM

## 2021-07-01 DIAGNOSIS — R80.9 PROTEINURIA, UNSPECIFIED: ICD-10-CM

## 2021-07-01 DIAGNOSIS — R31.29 OTHER MICROSCOPIC HEMATURIA: ICD-10-CM

## 2021-07-01 DIAGNOSIS — Z20.822 CONTACT WITH AND (SUSPECTED) EXPOSURE TO COVID-19: ICD-10-CM

## 2021-07-01 DIAGNOSIS — K63.5 POLYP OF COLON: ICD-10-CM

## 2021-07-01 DIAGNOSIS — Z23 ENCOUNTER FOR IMMUNIZATION: ICD-10-CM

## 2021-07-01 LAB
BILIRUB UR QL STRIP: NEGATIVE
CLARITY UR: CLEAR
GLUCOSE UR-MCNC: NEGATIVE
HCG UR QL: NORMAL EU/DL
HGB UR QL STRIP.AUTO: NORMAL
KETONES UR-MCNC: NORMAL
LEUKOCYTE ESTERASE UR QL STRIP: NORMAL
NITRITE UR QL STRIP: NEGATIVE
PH UR STRIP: 8
PROT UR STRIP-MCNC: NORMAL
SP GR UR STRIP: 1.01

## 2021-07-01 PROCEDURE — 99072 ADDL SUPL MATRL&STAF TM PHE: CPT

## 2021-07-01 PROCEDURE — G0444 DEPRESSION SCREEN ANNUAL: CPT | Mod: NC,59

## 2021-07-01 PROCEDURE — 90670 PCV13 VACCINE IM: CPT

## 2021-07-01 PROCEDURE — 93000 ELECTROCARDIOGRAM COMPLETE: CPT | Mod: 59

## 2021-07-01 PROCEDURE — G0009: CPT

## 2021-07-01 PROCEDURE — 99397 PER PM REEVAL EST PAT 65+ YR: CPT | Mod: 25

## 2021-07-01 PROCEDURE — 36415 COLL VENOUS BLD VENIPUNCTURE: CPT

## 2021-07-01 NOTE — PHYSICAL EXAM
[Well Nourished] : well nourished [Well Developed] : well developed [Well-Appearing] : well-appearing [Normal Sclera/Conjunctiva] : normal sclera/conjunctiva [PERRL] : pupils equal round and reactive to light [Normal Outer Ear/Nose] : the outer ears and nose were normal in appearance [No JVD] : no jugular venous distention [No Lymphadenopathy] : no lymphadenopathy [Thyroid Normal, No Nodules] : the thyroid was normal and there were no nodules present [Supple] : supple [No Respiratory Distress] : no respiratory distress  [No Accessory Muscle Use] : no accessory muscle use [Clear to Auscultation] : lungs were clear to auscultation bilaterally [Normal Rate] : normal rate  [Regular Rhythm] : with a regular rhythm [Normal S1, S2] : normal S1 and S2 [I] : a grade 1 [No Carotid Bruits] : no carotid bruits [No Abdominal Bruit] : a ~M bruit was not heard ~T in the abdomen [No Varicosities] : no varicosities [Pedal Pulses Present] : the pedal pulses are present [No Edema] : there was no peripheral edema [No Palpable Aorta] : no palpable aorta [No Extremity Clubbing/Cyanosis] : no extremity clubbing/cyanosis [Soft] : abdomen soft [Non Tender] : non-tender [No Masses] : no abdominal mass palpated [Non-distended] : non-distended [No HSM] : no HSM [Normal Bowel Sounds] : normal bowel sounds [Normal Posterior Cervical Nodes] : no posterior cervical lymphadenopathy [Normal Anterior Cervical Nodes] : no anterior cervical lymphadenopathy [No CVA Tenderness] : no CVA  tenderness [No Spinal Tenderness] : no spinal tenderness [No Joint Swelling] : no joint swelling [Grossly Normal Strength/Tone] : grossly normal strength/tone [No Rash] : no rash [Coordination Grossly Intact] : coordination grossly intact [No Focal Deficits] : no focal deficits [Normal Gait] : normal gait [Normal Affect] : the affect was normal [Deep Tendon Reflexes (DTR)] : deep tendon reflexes were 2+ and symmetric [Normal Insight/Judgement] : insight and judgment were intact

## 2021-07-01 NOTE — REVIEW OF SYSTEMS
[Negative] : Heme/Lymph [FreeTextEntry9] : some mild low back pain when wakes up, knee and ankle issues

## 2021-07-01 NOTE — HISTORY OF PRESENT ILLNESS
[FreeTextEntry1] : here for complete exam [de-identified] : osteoporosis sees Eboni\par exercises a lot, no exertional issues\par regular GYN Pap and mammogram\par Family history sister with thyroid cancer, thyroid nodules subcentimeter\par long hx microscopic hematuria saw Thall in past for this several times negative workups \par doing well on low dose escitalopram \par retired recently\par yearly derm exam [facility]

## 2021-07-01 NOTE — ASSESSMENT
[FreeTextEntry1] : doing well at ideal weight\par exercising without issues\par osteoporosis on meds\par thyroid nodules followed by Dr Rolon\par doing great on low dose escitalopram

## 2021-07-01 NOTE — HEALTH RISK ASSESSMENT
[Yes] : Yes [Monthly or less (1 pt)] : Monthly or less (1 point) [1 or 2 (0 pts)] : 1 or 2 (0 points) [Never (0 pts)] : Never (0 points) [0] : 2) Feeling down, depressed, or hopeless: Not at all (0) [Patient reported mammogram was normal] : Patient reported mammogram was normal [Patient reported PAP Smear was normal] : Patient reported PAP Smear was normal [Patient reported bone density results were abnormal] : Patient reported bone density results were abnormal [Patient reported colonoscopy was abnormal] : Patient reported colonoscopy was abnormal [PHQ-2 Negative - No further assessment needed] : PHQ-2 Negative - No further assessment needed [] : No [[id number]] : 0 [MammogramDate] : 1/2021 [PapSmearDate] : 8/2020 [BoneDensityDate] : 6/2019 [ColonoscopyDate] : 10/2019 [ColonoscopyComments] : benign polyp next 10.2024

## 2021-07-02 ENCOUNTER — NON-APPOINTMENT (OUTPATIENT)
Age: 66
End: 2021-07-02

## 2021-07-02 ENCOUNTER — TRANSCRIPTION ENCOUNTER (OUTPATIENT)
Age: 66
End: 2021-07-02

## 2021-07-02 LAB
25(OH)D3 SERPL-MCNC: 47 NG/ML
ALBUMIN SERPL ELPH-MCNC: 4.8 G/DL
ALP BLD-CCNC: 68 U/L
ALT SERPL-CCNC: 14 U/L
ANION GAP SERPL CALC-SCNC: 13 MMOL/L
APPEARANCE: ABNORMAL
AST SERPL-CCNC: 11 U/L
BACTERIA UR CULT: NORMAL
BACTERIA: ABNORMAL
BASOPHILS # BLD AUTO: 0.05 K/UL
BASOPHILS NFR BLD AUTO: 1.5 %
BILIRUB SERPL-MCNC: 0.7 MG/DL
BILIRUBIN URINE: NEGATIVE
BLOOD URINE: ABNORMAL
BUN SERPL-MCNC: 18 MG/DL
CALCIUM SERPL-MCNC: 9.8 MG/DL
CHLORIDE SERPL-SCNC: 100 MMOL/L
CHOLEST SERPL-MCNC: 194 MG/DL
CK SERPL-CCNC: 52 U/L
CO2 SERPL-SCNC: 26 MMOL/L
COLOR: NORMAL
COVID-19 SPIKE DOMAIN ANTIBODY INTERPRETATION: POSITIVE
CREAT SERPL-MCNC: 0.75 MG/DL
CREAT SPEC-SCNC: 57 MG/DL
EOSINOPHIL # BLD AUTO: 0.05 K/UL
EOSINOPHIL NFR BLD AUTO: 1.5 %
ESTIMATED AVERAGE GLUCOSE: 108 MG/DL
GLUCOSE QUALITATIVE U: NEGATIVE
GLUCOSE SERPL-MCNC: 102 MG/DL
HBA1C MFR BLD HPLC: 5.4 %
HCT VFR BLD CALC: 40.7 %
HDLC SERPL-MCNC: 75 MG/DL
HGB BLD-MCNC: 12.3 G/DL
HYALINE CASTS: 0 /LPF
IMM GRANULOCYTES NFR BLD AUTO: 0.6 %
KETONES URINE: NORMAL
LDLC SERPL CALC-MCNC: 103 MG/DL
LEUKOCYTE ESTERASE URINE: ABNORMAL
LYMPHOCYTES # BLD AUTO: 1 K/UL
LYMPHOCYTES NFR BLD AUTO: 29.2 %
MAN DIFF?: NORMAL
MCHC RBC-ENTMCNC: 22.4 PG
MCHC RBC-ENTMCNC: 30.2 GM/DL
MCV RBC AUTO: 74.3 FL
MICROALBUMIN 24H UR DL<=1MG/L-MCNC: 3.7 MG/DL
MICROALBUMIN/CREAT 24H UR-RTO: 65 MG/G
MICROSCOPIC-UA: NORMAL
MONOCYTES # BLD AUTO: 0.28 K/UL
MONOCYTES NFR BLD AUTO: 8.2 %
NEUTROPHILS # BLD AUTO: 2.02 K/UL
NEUTROPHILS NFR BLD AUTO: 59 %
NITRITE URINE: NEGATIVE
NONHDLC SERPL-MCNC: 119 MG/DL
PH URINE: 8.5
PLATELET # BLD AUTO: 313 K/UL
POTASSIUM SERPL-SCNC: 4.3 MMOL/L
PROT SERPL-MCNC: 7.2 G/DL
PROTEIN URINE: NORMAL
RBC # BLD: 5.48 M/UL
RBC # FLD: 15.2 %
RED BLOOD CELLS URINE: 12 /HPF
SARS-COV-2 AB SERPL IA-ACNC: >250 U/ML
SODIUM SERPL-SCNC: 139 MMOL/L
SPECIFIC GRAVITY URINE: 1.01
SQUAMOUS EPITHELIAL CELLS: >27 /HPF
T4 FREE SERPL-MCNC: 1.3 NG/DL
TRIGL SERPL-MCNC: 78 MG/DL
TSH SERPL-ACNC: 1.56 UIU/ML
URINE COMMENTS: NORMAL
UROBILINOGEN URINE: NORMAL
VIT B12 SERPL-MCNC: 1139 PG/ML
WBC # FLD AUTO: 3.42 K/UL
WHITE BLOOD CELLS URINE: 11 /HPF

## 2021-07-02 RX ORDER — ZOLPIDEM TARTRATE 10 MG/1
10 TABLET ORAL
Qty: 30 | Refills: 5 | Status: ACTIVE | COMMUNITY
Start: 1900-01-01 | End: 1900-01-01

## 2021-07-06 ENCOUNTER — TRANSCRIPTION ENCOUNTER (OUTPATIENT)
Age: 66
End: 2021-07-06

## 2021-07-06 LAB
25(OH)D3 SERPL-MCNC: 41.9 NG/ML
ALBUMIN SERPL ELPH-MCNC: 4.7 G/DL
ALP BLD-CCNC: 68 U/L
ALT SERPL-CCNC: 10 U/L
ANION GAP SERPL CALC-SCNC: 14 MMOL/L
AST SERPL-CCNC: 12 U/L
BILIRUB SERPL-MCNC: 0.6 MG/DL
BUN SERPL-MCNC: 20 MG/DL
CALCIUM SERPL-MCNC: 9.5 MG/DL
CHLORIDE SERPL-SCNC: 102 MMOL/L
CO2 SERPL-SCNC: 26 MMOL/L
CREAT SERPL-MCNC: 0.75 MG/DL
GLUCOSE SERPL-MCNC: 146 MG/DL
POTASSIUM SERPL-SCNC: 3.9 MMOL/L
PROT SERPL-MCNC: 6.8 G/DL
SODIUM SERPL-SCNC: 142 MMOL/L

## 2021-08-05 ENCOUNTER — APPOINTMENT (OUTPATIENT)
Dept: ENDOCRINOLOGY | Facility: CLINIC | Age: 66
End: 2021-08-05
Payer: COMMERCIAL

## 2021-08-05 VITALS
HEART RATE: 84 BPM | DIASTOLIC BLOOD PRESSURE: 60 MMHG | TEMPERATURE: 98.6 F | SYSTOLIC BLOOD PRESSURE: 101 MMHG | RESPIRATION RATE: 16 BRPM | WEIGHT: 109 LBS | BODY MASS INDEX: 20.58 KG/M2 | OXYGEN SATURATION: 98 % | HEIGHT: 61.2 IN

## 2021-08-05 PROCEDURE — 99214 OFFICE O/P EST MOD 30 MIN: CPT

## 2021-08-05 NOTE — ASSESSMENT
[FreeTextEntry1] : Osteoporosis at the spine/ hip areas. Overall stable BMD, with some decline at the hip area\par I advised to the patient on other antiresorptive therapy, including retrying Tymlos, switching to Evenity or prolia. \par R+B of each options were discussed in details\par - calcium 500 mg bid, add extra OTC vitamin D3 2,000 IU/day\par - continue weight-bearing exercises; advised avoiding high risk sports\par - prior dental evaluation advised.\par - given slight worsening of BMD in the hip, and overall low BMD at the LS areas, resuming an ART is indicated. We will cont Tymlos and if does not tolerate, will switch to Evenity, followed by Prolia injections.\par If remains oin Tymlos- tent finish date- 12/1/2022\par - repeat early  DXA 3 sites in 06/22\par In regards of her MNG, given (+) family history of thyroid cancer, close observation is advised. We'll repeat another thyroid US  at [facility], and might consider an early FNA\par RTC 6 mos, or sooner prn

## 2021-08-05 NOTE — HISTORY OF PRESENT ILLNESS
[FreeTextEntry1] : 66 year female  referred for osteoporosis management. \par \par *** Aug 05, 2021 ***\par \par started Tymlos at the beginning of June- doing well on in so far. no joint pain, sleep problems. denies dizziness/ palpitations \par no recent labs\par DXA (6/29/21)- FN (-2.7), TH (-2.4), L4 is excluded, L2 (-3.4), radius 33% (-2.2)\par \par *** Apr 05, 2021 ***\par \par stopped Forteo b/o severe arthralgia and poor sleep. Symptoms improved since stopping \par came to discuss other options\par \par HPI:\par She  was diagnosed with osteoporosis in 2012. On Boniva for 5 years, stopped for "drug holiday" in 2017 and was told that her BMD was stable. \par Cancer history: none\par Calcium and vitamin D supplementation: calcium 600 mg with D, no extra vitamin D\par Fracture history: wrist fracture a few years ago\par Bone disease risk factors: no history of kidney stones, liver disease, kidney disease, thyroid disease, anticonvulsant use, glucocorticoid use, autoimmune disorders such as rheumatoid arthritis and SLE, COPD, IBD, known malabsorption disorders, or weight loss. \par Family history is negative for calcium disorders, nephrolithiasis, pancreatitis and tumors. \par *** Patient's sister was diagnosed with a thyroid cancer\par Currently, no symptoms of polyuria, polydipsia, constipation, abdominal pain, mental confusion, depression, bone pain or fractures. \par She  denies hot flashes, leg swelling, blood clots, history of radiation therapy.\par She  lost about 1/2" in height.\par DXA (6/10/19)- FN (-2.7), LS (-2.2) with OA at L4 and L1 (-2.8) and L2 (-3.2)\par DXA (6/9/17)- FN (-2.6), LS (-2.4) with OA at L4 and L1 (-3.1) and L2 (-3.3)\par DXA (11/18/15) at [facility] - FN (-2.8), LS (-2.2) with OA at L4 and L1 (-2.9) and L2 (-3.2)\par DXA (10/12/12) at [facility] - FN (-2.9), LS (-2.6) with OA at L4 and L1 (-3.0) and L2 (-2.9)\par \par Thyroid US (7/18/20)- heterogenous thyroid, right sided 0.5x0.4x0.3 hypo, stable Left sided 0.9x0.4x0.6 nodule\par Thyroid US (6/10/19)- LLP 0.8x0.3x0.6 iso nodule

## 2021-08-10 ENCOUNTER — TRANSCRIPTION ENCOUNTER (OUTPATIENT)
Age: 66
End: 2021-08-10

## 2021-08-10 LAB
25(OH)D3 SERPL-MCNC: 40.5 NG/ML
ANION GAP SERPL CALC-SCNC: 11 MMOL/L
BUN SERPL-MCNC: 20 MG/DL
CALCIUM SERPL-MCNC: 9.5 MG/DL
CHLORIDE SERPL-SCNC: 102 MMOL/L
CO2 SERPL-SCNC: 28 MMOL/L
CREAT SERPL-MCNC: 0.77 MG/DL
GLUCOSE SERPL-MCNC: 124 MG/DL
POTASSIUM SERPL-SCNC: 4 MMOL/L
SODIUM SERPL-SCNC: 141 MMOL/L
T4 FREE SERPL-MCNC: 1.1 NG/DL
TSH SERPL-ACNC: 1.22 UIU/ML

## 2021-11-10 ENCOUNTER — RX RENEWAL (OUTPATIENT)
Age: 66
End: 2021-11-10

## 2021-11-19 ENCOUNTER — TRANSCRIPTION ENCOUNTER (OUTPATIENT)
Age: 66
End: 2021-11-19

## 2021-12-16 RX ORDER — PEN NEEDLE, DIABETIC 29 G X1/2"
32G X 4 MM NEEDLE, DISPOSABLE MISCELLANEOUS
Qty: 100 | Refills: 3 | Status: ACTIVE | COMMUNITY
Start: 2020-10-07 | End: 1900-01-01

## 2022-01-31 ENCOUNTER — APPOINTMENT (OUTPATIENT)
Dept: ENDOCRINOLOGY | Facility: CLINIC | Age: 67
End: 2022-01-31
Payer: COMMERCIAL

## 2022-01-31 VITALS
SYSTOLIC BLOOD PRESSURE: 130 MMHG | HEIGHT: 61.2 IN | WEIGHT: 110 LBS | TEMPERATURE: 97.8 F | OXYGEN SATURATION: 98 % | BODY MASS INDEX: 20.77 KG/M2 | DIASTOLIC BLOOD PRESSURE: 60 MMHG | RESPIRATION RATE: 17 BRPM | HEART RATE: 85 BPM

## 2022-01-31 DIAGNOSIS — E04.2 NONTOXIC MULTINODULAR GOITER: ICD-10-CM

## 2022-01-31 PROCEDURE — 99214 OFFICE O/P EST MOD 30 MIN: CPT | Mod: 25

## 2022-01-31 NOTE — ASSESSMENT
[FreeTextEntry1] : 1. Osteoporosis at the spine/ hip areas. Overall stable BMD, with some decline at the hip area\par given slight worsening of BMD in the hip, and overall low BMD at the LS areas, resuming an ART is indicated\par - cont Tymlos daily\par tent finish date- 12/1/2022. will switch to Prolia after. If  does not tolerate, will switch to Evenity, followed by Prolia injections.\par - calcium 500 mg bid, add extra OTC vitamin D3 2,000 IU/day\par - continue weight-bearing exercises; advised avoiding high risk sports\par - repeat early  DXA 3 sites in 07/22\par \par 2. MNG\par given (+) family history of thyroid cancer, close observation is advised. We'll repeat another thyroid US  at [facility] in 10/22 and might consider an early FNA\par RTC 6 mos, or sooner prn. Patient is moving to [place], [place]. Will get established with an endocrinologist there.

## 2022-01-31 NOTE — HISTORY OF PRESENT ILLNESS
[FreeTextEntry1] : 66 year female  referred for osteoporosis management. \par \par *** Jan 31, 2022 ***\par \par feels well, no new c/o. no joint pains/ thigh or jaw pains. no dizziness. Tolerates Tymlos well\par no recent labs\par Thyr US (10/13/21)- RMP 0.6 complex with tiny microcalc, LLP 0.9 complex, LUP 0.5 complex . All stable\par \par *** Aug 05, 2021 ***\par \par started Tymlos at the beginning of June- doing well on in so far. no joint pain, sleep problems. denies dizziness/ palpitations \par no recent labs\par DXA (6/29/21)- FN (-2.7), TH (-2.4), L4 is excluded, L2 (-3.4), radius 33% (-2.2)\par \par *** Apr 05, 2021 ***\par \par stopped Forteo b/o severe arthralgia and poor sleep. Symptoms improved since stopping \par came to discuss other options\par \par HPI:\par She  was diagnosed with osteoporosis in 2012. On Boniva for 5 years, stopped for "drug holiday" in 2017 and was told that her BMD was stable. \par Cancer history: none\par Calcium and vitamin D supplementation: calcium 600 mg with D, no extra vitamin D\par Fracture history: wrist fracture a few years ago\par Bone disease risk factors: no history of kidney stones, liver disease, kidney disease, thyroid disease, anticonvulsant use, glucocorticoid use, autoimmune disorders such as rheumatoid arthritis and SLE, COPD, IBD, known malabsorption disorders, or weight loss. \par Family history is negative for calcium disorders, nephrolithiasis, pancreatitis and tumors. \par *** Patient's sister was diagnosed with a thyroid cancer\par Currently, no symptoms of polyuria, polydipsia, constipation, abdominal pain, mental confusion, depression, bone pain or fractures. \par She  denies hot flashes, leg swelling, blood clots, history of radiation therapy.\par She  lost about 1/2" in height.\par DXA (6/10/19)- FN (-2.7), LS (-2.2) with OA at L4 and L1 (-2.8) and L2 (-3.2)\par DXA (6/9/17)- FN (-2.6), LS (-2.4) with OA at L4 and L1 (-3.1) and L2 (-3.3)\par DXA (11/18/15) at [facility] - FN (-2.8), LS (-2.2) with OA at L4 and L1 (-2.9) and L2 (-3.2)\par DXA (10/12/12) at [facility] - FN (-2.9), LS (-2.6) with OA at L4 and L1 (-3.0) and L2 (-2.9)\par \par Thyroid US (7/18/20)- heterogenous thyroid, right sided 0.5x0.4x0.3 hypo, stable Left sided 0.9x0.4x0.6 nodule\par Thyroid US (6/10/19)- LLP 0.8x0.3x0.6 iso nodule

## 2022-02-04 ENCOUNTER — TRANSCRIPTION ENCOUNTER (OUTPATIENT)
Age: 67
End: 2022-02-04

## 2022-02-04 LAB
25(OH)D3 SERPL-MCNC: 38.1 NG/ML
ALBUMIN SERPL ELPH-MCNC: 4.7 G/DL
ALP BLD-CCNC: 60 U/L
ALP BONE SERPL-MCNC: 11.7 UG/L
ALT SERPL-CCNC: 12 U/L
ANION GAP SERPL CALC-SCNC: 13 MMOL/L
AST SERPL-CCNC: 13 U/L
BASOPHILS # BLD AUTO: 0.04 K/UL
BASOPHILS NFR BLD AUTO: 1.1 %
BILIRUB SERPL-MCNC: 0.7 MG/DL
BUN SERPL-MCNC: 22 MG/DL
CALCIUM SERPL-MCNC: 9.4 MG/DL
CHLORIDE SERPL-SCNC: 102 MMOL/L
CO2 SERPL-SCNC: 27 MMOL/L
COLLAGEN NTX UR-SCNC: 426 NMOL BCE
COLLAGEN NTX/CREAT UR-SRTO: 37
CREAT SERPL-MCNC: 0.8 MG/DL
CREAT UR-MCNC: 129.2 MG/DL
EOSINOPHIL # BLD AUTO: 0.04 K/UL
EOSINOPHIL NFR BLD AUTO: 1.1 %
GLUCOSE SERPL-MCNC: 99 MG/DL
HCT VFR BLD CALC: 39.6 %
HGB BLD-MCNC: 12.4 G/DL
IMM GRANULOCYTES NFR BLD AUTO: 0.3 %
INTERPRETIVE GUIDE:: NORMAL
LYMPHOCYTES # BLD AUTO: 1.18 K/UL
LYMPHOCYTES NFR BLD AUTO: 32.1 %
MAN DIFF?: NORMAL
MCHC RBC-ENTMCNC: 22.8 PG
MCHC RBC-ENTMCNC: 31.3 GM/DL
MCV RBC AUTO: 72.8 FL
MONOCYTES # BLD AUTO: 0.36 K/UL
MONOCYTES NFR BLD AUTO: 9.8 %
NEUTROPHILS # BLD AUTO: 2.05 K/UL
NEUTROPHILS NFR BLD AUTO: 55.6 %
OSTEOCALCIN SERPL-MCNC: 17 NG/ML
PLATELET # BLD AUTO: 261 K/UL
POTASSIUM SERPL-SCNC: 4.3 MMOL/L
PROT SERPL-MCNC: 7 G/DL
RBC # BLD: 5.44 M/UL
RBC # FLD: 15.9 %
SODIUM SERPL-SCNC: 141 MMOL/L
T4 FREE SERPL-MCNC: 1.3 NG/DL
TSH SERPL-ACNC: 1.45 UIU/ML
WBC # FLD AUTO: 3.68 K/UL

## 2022-02-06 ENCOUNTER — TRANSCRIPTION ENCOUNTER (OUTPATIENT)
Age: 67
End: 2022-02-06

## 2022-02-07 LAB — COLLAGEN CTX SERPL-MCNC: 224 PG/ML

## 2022-03-17 ENCOUNTER — TRANSCRIPTION ENCOUNTER (OUTPATIENT)
Age: 67
End: 2022-03-17

## 2022-05-04 RX ORDER — ABALOPARATIDE 2000 UG/ML
3120 INJECTION, SOLUTION SUBCUTANEOUS
Qty: 1 | Refills: 11 | Status: ACTIVE | COMMUNITY
Start: 2021-04-05

## 2022-06-06 ENCOUNTER — FORM ENCOUNTER (OUTPATIENT)
Age: 67
End: 2022-06-06

## 2022-07-06 ENCOUNTER — APPOINTMENT (OUTPATIENT)
Dept: INTERNAL MEDICINE | Facility: CLINIC | Age: 67
End: 2022-07-06

## 2022-07-14 ENCOUNTER — OUTPATIENT (OUTPATIENT)
Dept: OUTPATIENT SERVICES | Facility: HOSPITAL | Age: 67
LOS: 1 days | End: 2022-07-14
Payer: COMMERCIAL

## 2022-07-14 ENCOUNTER — APPOINTMENT (OUTPATIENT)
Dept: RADIOLOGY | Facility: CLINIC | Age: 67
End: 2022-07-14

## 2022-07-14 ENCOUNTER — TRANSCRIPTION ENCOUNTER (OUTPATIENT)
Age: 67
End: 2022-07-14

## 2022-07-14 DIAGNOSIS — Z00.8 ENCOUNTER FOR OTHER GENERAL EXAMINATION: ICD-10-CM

## 2022-07-14 PROCEDURE — 77080 DXA BONE DENSITY AXIAL: CPT | Mod: 26

## 2022-07-14 PROCEDURE — 77080 DXA BONE DENSITY AXIAL: CPT

## 2022-07-20 ENCOUNTER — APPOINTMENT (OUTPATIENT)
Dept: ENDOCRINOLOGY | Facility: CLINIC | Age: 67
End: 2022-07-20

## 2022-07-20 DIAGNOSIS — M81.0 AGE-RELATED OSTEOPOROSIS W/OUT CURRENT PATHOLOGICAL FRACTURE: ICD-10-CM

## 2022-07-20 DIAGNOSIS — E04.1 NONTOXIC SINGLE THYROID NODULE: ICD-10-CM

## 2022-07-20 PROCEDURE — 99442: CPT

## 2022-07-20 NOTE — ASSESSMENT
[FreeTextEntry1] : 1. Osteoporosis at the spine/ hip areas.\par given slight worsening of BMD in the hip, and overall low BMD at the LS areas, resuming an ART is indicated\par - improvement on Tymlos\par - cont Tymlos daily\par tent finish date- 12/1/2022. will switch to Prolia after. If  does not tolerate, will switch to Evenity, followed by Prolia injections.\par - calcium 500 mg bid, add extra OTC vitamin D3 2,000 IU/day\par - continue weight-bearing exercises; advised avoiding high risk sports\par - repeat DXA 3 sites in 07/24\par \par 2. MNG\par given (+) family history of thyroid cancer, close observation is advised. She will repeat another thyroid US in [place] in 10/22 and might consider an early FNA\par Patient is relocating to [place], [place]. She'll be establishing her visits with an endocrinologist there and will informa us , so we can transfer the records.

## 2022-07-20 NOTE — HISTORY OF PRESENT ILLNESS
[Home] : at home, [unfilled] , at the time of the visit. [Medical Office: ([facility])___] : at the medical office located in  [Verbal consent obtained from patient] : the patient, [unfilled] [FreeTextEntry1] : 66 year female  f/u for osteoporosis management. \par \par *** Phone visit  Jul 20, 2022 ***\par \par feels well. tolerates Tymlos. denies any c/o\par no joint pain. denies dizziness\par \par DXA(7/14/22)- LS (-1.7), FN (-2.6), TH (-2.3)\par \par *** Jan 31, 2022 ***\par \par feels well, no new c/o. no joint pains/ thigh or jaw pains. no dizziness. Tolerates Tymlos well\par no recent labs\par Thyr US (10/13/21)- RMP 0.6 complex with tiny microcalc, LLP 0.9 complex, LUP 0.5 complex . All stable\par \par *** Aug 05, 2021 ***\par \par started Tymlos at the beginning of June- doing well on in so far. no joint pain, sleep problems. denies dizziness/ palpitations \par no recent labs\par DXA (6/29/21)- FN (-2.7), TH (-2.4), L4 is excluded, L2 (-3.4), radius 33% (-2.2)\par \par *** Apr 05, 2021 ***\par \par stopped Forteo b/o severe arthralgia and poor sleep. Symptoms improved since stopping \par came to discuss other options\par \par HPI:\par She  was diagnosed with osteoporosis in 2012. On Boniva for 5 years, stopped for "drug holiday" in 2017 and was told that her BMD was stable. \par Cancer history: none\par Calcium and vitamin D supplementation: calcium 600 mg with D, no extra vitamin D\par Fracture history: wrist fracture a few years ago\par Bone disease risk factors: no history of kidney stones, liver disease, kidney disease, thyroid disease, anticonvulsant use, glucocorticoid use, autoimmune disorders such as rheumatoid arthritis and SLE, COPD, IBD, known malabsorption disorders, or weight loss. \par Family history is negative for calcium disorders, nephrolithiasis, pancreatitis and tumors. \par *** Patient's sister was diagnosed with a thyroid cancer\par Currently, no symptoms of polyuria, polydipsia, constipation, abdominal pain, mental confusion, depression, bone pain or fractures. \par She  denies hot flashes, leg swelling, blood clots, history of radiation therapy.\par She  lost about 1/2" in height.\par DXA (6/10/19)- FN (-2.7), LS (-2.2) with OA at L4 and L1 (-2.8) and L2 (-3.2)\par DXA (6/9/17)- FN (-2.6), LS (-2.4) with OA at L4 and L1 (-3.1) and L2 (-3.3)\par DXA (11/18/15) at [facility] - FN (-2.8), LS (-2.2) with OA at L4 and L1 (-2.9) and L2 (-3.2)\par DXA (10/12/12) at [facility] - FN (-2.9), LS (-2.6) with OA at L4 and L1 (-3.0) and L2 (-2.9)\par \par Thyroid US (7/18/20)- heterogenous thyroid, right sided 0.5x0.4x0.3 hypo, stable Left sided 0.9x0.4x0.6 nodule\par Thyroid US (6/10/19)- LLP 0.8x0.3x0.6 iso nodule

## 2022-08-29 NOTE — ASSESSMENT
HPI:   Chief complaints: Susu Lagos is a pleasant 56 year old female who presents for Full skin cancer screening to rule out skin cancer   Last Skin Exam: n/a      1st Baseline: yes  Personal HX of Skin Cancer: no   Personal HX of Malignant Melanoma: no   Family HX of Skin Cancer / Malignant Melanoma: no  Personal HX of Atypical Moles:   no  Risk factors: history of sun exposure and burns  New / Changing lesions:yes spot on the left upper cheek  Social History: teaches math; now a sub and loves the flexibility   On review of systems, there are no further skin complaints, patient is feeling otherwise well.   ROS of the following were done and are negative: Constitutional, Eyes, Ears, Nose,   Mouth, Throat, Cardiovascular, Respiratory, GI, Genitourinary, Musculoskeletal,   Psychiatric, Endocrine, Allergic/Immunologic.    PHYSICAL EXAM:   LMP 01/29/2016   Skin exam performed as follows: Type 2 skin. Mood appropriate  Alert and Oriented X 3. Well developed, well nourished in no distress.  General appearance: Normal  Head including face: Normal  Eyes: conjunctiva and lids: Normal  Mouth: Lips, teeth, gums: Normal  Neck: Normal  Chest-breast/axillae: Normal  Back: Normal  Spleen and liver: Normal  Cardiovascular: Exam of peripheral vascular system by observation for swelling, varicosities, edema: Normal  Genitalia: groin, buttocks: Normal  Extremities: digits/nails (clubbing): Normal  Eccrine and Apocrine glands: Normal  Right upper extremity: Normal  Left upper extremity: Normal  Right lower extremity: Normal  Left lower extremity: Normal  Skin: Scalp and body hair: See below    Pt deferred exam of breasts, groin, buttocks: No    Other physical findings:  1. Multiple pigmented macules on extremities and trunk  2. Multiple pigmented macules on face, trunk and extremities  3. Multiple vascular papules on trunk, arms and legs  4. Multiple scattered keratotic plaques  5. 10 mm light brown patch on the left upper cheek  [FreeTextEntry1] : Patient with a long term history of osteoporosis. Blood work for secondary causes were negative.Her recent BD is overall stable but does show osteoporosis. L2 - 3.2 and neck left hip - 2.7   Will consider Forteo. . For now she will continue a healthy lifestyle exercise and maintain an  adequate  diet in calcium and vitamin D.She was told in the past she had thyroid nodules and her sister had a h/o of thyroid cancer; Recent sonogram shows 8 mm thyroid nodule, will check again one year, [facility] 3    6. 3 mm fleshy papule on the right nasal side wall       Except as noted above, no other signs of skin cancer or melanoma.     ASSESSMENT/PLAN:   Benign Full skin cancer screening today. . Patient with history of none  Advised on monthly self exams and 1 year  Patient Education: Appropriate brochures given.    1. Multiple benign appearing melanocytic nevi on arms, legs and trunk. Discussed ABCDEs of melanoma and sunscreen.   2. Multiple lentigos on arms, legs and trunk. Advised benign, no treatment needed.  3. Multiple scattered angiomas. Advised benign, no treatment needed.   4. Seborrheic keratosis on arms, legs and trunk. Advised benign, no treatment needed.  5. Benign appearing lentigo on the left upper cheek - will monitor. Photograph taken for reference.   6. Dermal nevus on the right nsw - appears benign; will monitor.             Follow-up: yearly    1.) Patient was asked about new and changing moles. YES  2.) Patient received a complete physical skin examination: YES  3.) Patient was counseled to perform a monthly self skin examination: YES  Scribed By: Caroline Calderon MS, PA-C       [Teriparatide Therapy] : Risks  and benefits of Teriparatide therapy were discussed with the patient including potential risk of osteosarcoma

## 2022-11-25 ENCOUNTER — RX RENEWAL (OUTPATIENT)
Age: 67
End: 2022-11-25

## 2022-11-25 RX ORDER — ESCITALOPRAM OXALATE 5 MG/1
5 TABLET ORAL
Qty: 90 | Refills: 3 | Status: ACTIVE | COMMUNITY
Start: 2018-11-27 | End: 1900-01-01

## 2023-12-16 NOTE — PHYSICAL EXAM
